# Patient Record
Sex: FEMALE | Race: WHITE
[De-identification: names, ages, dates, MRNs, and addresses within clinical notes are randomized per-mention and may not be internally consistent; named-entity substitution may affect disease eponyms.]

---

## 2023-07-18 ENCOUNTER — HOSPITAL ENCOUNTER (EMERGENCY)
Age: 48
Discharge: HOME | End: 2023-07-18
Payer: OTHER GOVERNMENT

## 2023-07-18 VITALS — OXYGEN SATURATION: 100 % | HEART RATE: 77 BPM | SYSTOLIC BLOOD PRESSURE: 119 MMHG | DIASTOLIC BLOOD PRESSURE: 64 MMHG

## 2023-07-18 VITALS
DIASTOLIC BLOOD PRESSURE: 63 MMHG | RESPIRATION RATE: 22 BRPM | TEMPERATURE: 98.06 F | HEART RATE: 86 BPM | OXYGEN SATURATION: 100 % | SYSTOLIC BLOOD PRESSURE: 134 MMHG

## 2023-07-18 VITALS — DIASTOLIC BLOOD PRESSURE: 72 MMHG | OXYGEN SATURATION: 100 % | SYSTOLIC BLOOD PRESSURE: 121 MMHG | HEART RATE: 82 BPM

## 2023-07-18 VITALS — SYSTOLIC BLOOD PRESSURE: 119 MMHG | OXYGEN SATURATION: 100 % | HEART RATE: 84 BPM | DIASTOLIC BLOOD PRESSURE: 71 MMHG

## 2023-07-18 VITALS — SYSTOLIC BLOOD PRESSURE: 130 MMHG | DIASTOLIC BLOOD PRESSURE: 71 MMHG

## 2023-07-18 VITALS — SYSTOLIC BLOOD PRESSURE: 122 MMHG | OXYGEN SATURATION: 100 % | DIASTOLIC BLOOD PRESSURE: 68 MMHG | HEART RATE: 76 BPM

## 2023-07-18 VITALS — BODY MASS INDEX: 33.9 KG/M2

## 2023-07-18 DIAGNOSIS — D64.9: Primary | ICD-10-CM

## 2023-07-18 LAB
ADD MANUAL DIFF / SLIDE REVIEW: NO
ALBUMIN SERPL-MCNC: 4.2 G/DL (ref 3.5–5)
ALBUMIN/GLOB SERPL: 1.4 {RATIO} (ref 1–2.8)
ALP SERPL-CCNC: 53 U/L (ref 38–126)
ALT SERPL-CCNC: 26 IU/L (ref ?–35)
BUN SERPL-MCNC: 12 MG/DL (ref 7–17)
CALCIUM SERPL-MCNC: 8.4 MG/DL (ref 8.4–10.2)
CHLORIDE SERPL-SCNC: 104 MMOL/L (ref 98–107)
CO2 SERPL-SCNC: 23 MMOL/L (ref 22–32)
ESTIMATED GLOMERULAR FILT RATE: > 60 ML/MIN (ref 60–?)
GLOBULIN SER CALC-MCNC: 3.1 G/DL (ref 1.7–4.1)
GLUCOSE SERPL-MCNC: 96 MG/DL (ref 70–100)
HEMATOCRIT: 23.9 % (ref 36–46)
HEMATOCRIT: 24.3 % (ref 36–46)
HEMOGLOBIN: 7.1 G/DL (ref 12–16)
HEMOGLOBIN: 7.3 G/DL (ref 12–16)
HEMOLYSIS: < 15 (ref 0–50)
HYPOCHROMIA BLD QL: (no result)
INR PPP: 1 (ref 0.9–1.3)
LYMPHOCYTES # SPEC AUTO: 1600 /UL (ref 1100–4500)
MCV RBC: 60.2 FL (ref 80–100)
MEAN CORPUSCULAR HEMOGLOBIN: 18.1 PG (ref 26–34)
MEAN CORPUSCULAR HGB CONC: 30.1 % (ref 30–36)
MICROCYTOSIS: (no result)
PLATELET COUNT: 289 X10^3/UL (ref 150–400)
POTASSIUM SERPL-SCNC: 3.9 MMOL/L (ref 3.4–5.1)
PROT SERPL-MCNC: 7.3 G/DL (ref 6.3–8.2)
PROTHROMBIN TIME: 11.7 SECONDS (ref 10.1–12.7)
PTT PARTIAL THROMBOPLASTIN TIM: 27 SECONDS (ref 26–36)
RBC MORPH BLD: (no result)
SODIUM SERPL-SCNC: 136 MMOL/L (ref 137–145)

## 2023-07-18 PROCEDURE — 85610 PROTHROMBIN TIME: CPT

## 2023-07-18 PROCEDURE — 85730 THROMBOPLASTIN TIME PARTIAL: CPT

## 2023-07-18 PROCEDURE — 85018 HEMOGLOBIN: CPT

## 2023-07-18 PROCEDURE — 86901 BLOOD TYPING SEROLOGIC RH(D): CPT

## 2023-07-18 PROCEDURE — 86900 BLOOD TYPING SEROLOGIC ABO: CPT

## 2023-07-18 PROCEDURE — 99284 EMERGENCY DEPT VISIT MOD MDM: CPT

## 2023-07-18 PROCEDURE — 85025 COMPLETE CBC W/AUTO DIFF WBC: CPT

## 2023-07-18 PROCEDURE — 85014 HEMATOCRIT: CPT

## 2023-07-18 PROCEDURE — 36415 COLL VENOUS BLD VENIPUNCTURE: CPT

## 2023-07-18 PROCEDURE — C9113 INJ PANTOPRAZOLE SODIUM, VIA: HCPCS

## 2023-07-18 PROCEDURE — 86850 RBC ANTIBODY SCREEN: CPT

## 2023-07-18 PROCEDURE — 80053 COMPREHEN METABOLIC PANEL: CPT

## 2023-07-18 PROCEDURE — 96374 THER/PROPH/DIAG INJ IV PUSH: CPT

## 2023-07-28 ENCOUNTER — HOSPITAL ENCOUNTER (OUTPATIENT)
Dept: HOSPITAL 76 - DI | Age: 48
Discharge: HOME | End: 2023-07-28
Attending: STUDENT IN AN ORGANIZED HEALTH CARE EDUCATION/TRAINING PROGRAM
Payer: COMMERCIAL

## 2023-07-28 DIAGNOSIS — N83.202: ICD-10-CM

## 2023-07-28 DIAGNOSIS — D25.0: Primary | ICD-10-CM

## 2023-07-28 DIAGNOSIS — D25.2: ICD-10-CM

## 2023-07-28 NOTE — ULTRASOUND REPORT
PROCEDURE:  Pelvic w/Transvaginal

 

INDICATIONS:  AUB

 

TECHNIQUE:  

Real-time scanning was performed of the pelvic organs, with image documentation.  Additional endovagi
nal scanning was necessary due to incomplete visualization of the adnexal and endometrial structures 
by transabdominal scanning.  

 

COMPARISON:  None.

 

FINDINGS:  

 

Uterus:  Uterus is anteverted and enlarged at 11.9 x 7.8 x 7.4 cm.  The myometrium is heterogeneous a
nd contains fibroids. Small amount of fluid is seen within the endometrial canal. Endometrium is subj
ectively within normal limits in thickness. The measured thickness includes a submucosal fibroid. Mul
tiple fibroids are seen as follows:

A left anterior transmural fibroid measures 2.6 x 3.2 x 3.1 cm.

A midline posterior nearly completely submucosal fibroid measures 2.2 x 2.0 x 2.1 cm.

A right posterior fundal intramural and subserosal fibroid measures 3.0 x 3.0 x 3.4 cm.

 

Ovaries:  The right ovary measures 2.5 x 2.3 x 2.1 cm, with a calculated ovarian volume of 6.2 cc.  T
he left ovary measures 7.3 x 6.4 x 5.3 cm, with a calculated ovarian volume of 129 cc. Adjacent simpl
e right ovarian cysts are seen measuring 4.4 x 4.3 x 3.6 cm and 3.2 x 3.4 x 2.3 cm respectively, vers
us a single septated cyst. No adnexal masses are seen. No cystic lesions measuring greater than 3 cm.


 

Other:  No pathologic free abdominal or pelvic fluid.

 

IMPRESSION:  

1.Multiple uterine fibroids are seen including a 2.2 cm submucosal fibroid.

2.Left ovary is enlarged by adjacent simple cysts versus a single septated cyst. Consider follow-up u
ltrasound in 6-12 weeks to demonstrate resolution.

 

 

 

Reviewed by: Dheeraj Luque MD on 7/28/2023 8:41 PM PDT

Approved by: Dheeraj Luque MD on 7/28/2023 8:41 PM PDT

 

 

Station ID:  [id number]

## 2023-08-21 ENCOUNTER — HOSPITAL ENCOUNTER (OUTPATIENT)
Dept: HOSPITAL 76 - LAB | Age: 48
Discharge: HOME | End: 2023-08-21
Attending: OBSTETRICS & GYNECOLOGY
Payer: COMMERCIAL

## 2023-08-21 DIAGNOSIS — N83.201: ICD-10-CM

## 2023-08-21 DIAGNOSIS — N92.0: ICD-10-CM

## 2023-08-21 DIAGNOSIS — D50.0: Primary | ICD-10-CM

## 2023-08-21 LAB
BASOPHILS NFR BLD AUTO: 0.1 10^3/UL (ref 0–0.1)
BASOPHILS NFR BLD AUTO: 1.3 %
CANCER AG125 SERPL-ACNC: 22.4 U/ML (ref 0.5–35)
EOSINOPHIL # BLD AUTO: 0.1 10^3/UL (ref 0–0.7)
EOSINOPHIL NFR BLD AUTO: 1.8 %
ERYTHROCYTE [DISTWIDTH] IN BLOOD BY AUTOMATED COUNT: 19.8 % (ref 12–15)
HCT VFR BLD AUTO: 30 % (ref 37–47)
HGB UR QL STRIP: 8.1 G/DL (ref 12–16)
LYMPHOCYTES # SPEC AUTO: 1.3 10^3/UL (ref 1.5–3.5)
LYMPHOCYTES NFR BLD AUTO: 23.7 %
MCH RBC QN AUTO: 17.8 PG (ref 27–31)
MCHC RBC AUTO-ENTMCNC: 27 G/DL (ref 32–36)
MCV RBC AUTO: 65.9 FL (ref 81–99)
MONOCYTES # BLD AUTO: 0.4 10^3/UL (ref 0–1)
MONOCYTES NFR BLD AUTO: 7.1 %
NEUTROPHILS # BLD AUTO: 3.6 10^3/UL (ref 1.5–6.6)
NEUTROPHILS # SNV AUTO: 5.5 X10^3/UL (ref 4.8–10.8)
NEUTROPHILS NFR BLD AUTO: 65.7 %
NRBC # BLD AUTO: 0 /100WBC
NRBC # BLD AUTO: 0 X10^3/UL
PDW BLD AUTO: 9.7 FL (ref 7.9–10.8)
PLAT MORPH BLD: (no result)
PLATELET # BLD: 302 10^3/UL (ref 130–450)
PLATELET BLD QL SMEAR: (no result)
RBC MAR: 4.55 10^6/UL (ref 4.2–5.4)
RBC MORPH BLD: (no result)
TSH SERPL-ACNC: 0.94 UIU/ML (ref 0.34–5.6)

## 2023-08-21 PROCEDURE — 85025 COMPLETE CBC W/AUTO DIFF WBC: CPT

## 2023-08-21 PROCEDURE — 36415 COLL VENOUS BLD VENIPUNCTURE: CPT

## 2023-08-21 PROCEDURE — 84443 ASSAY THYROID STIM HORMONE: CPT

## 2023-08-21 PROCEDURE — 86304 IMMUNOASSAY TUMOR CA 125: CPT

## 2023-09-05 ENCOUNTER — HOSPITAL ENCOUNTER (OUTPATIENT)
Dept: HOSPITAL 76 - LAB | Age: 48
Discharge: HOME | End: 2023-09-05
Attending: INTERNAL MEDICINE
Payer: COMMERCIAL

## 2023-09-05 DIAGNOSIS — D50.9: Primary | ICD-10-CM

## 2023-09-05 LAB
ERYTHROCYTE [DISTWIDTH] IN BLOOD BY AUTOMATED COUNT: 20.5 % (ref 12–15)
FERRITIN SERPL-SCNC: 3.9 NG/ML (ref 11–306.8)
HCT VFR BLD AUTO: 30.3 % (ref 37–47)
HGB UR QL STRIP: 8.3 G/DL (ref 12–16)
IRON SATN MFR SERPL: 3 % (ref 20–50)
IRON SERPL-MCNC: 14 UG/DL (ref 50–212)
MCH RBC QN AUTO: 18.1 PG (ref 27–31)
MCHC RBC AUTO-ENTMCNC: 27.4 G/DL (ref 32–36)
MCV RBC AUTO: 66.2 FL (ref 81–99)
NEUTROPHILS # BLD AUTO: 6.9 10^3/UL (ref 1.5–6.6)
NEUTROPHILS # SNV AUTO: 9.5 X10^3/UL (ref 4.8–10.8)
NEUTROPHILS NFR BLD AUTO: 72.2 %
PDW BLD AUTO: 9.2 FL (ref 7.9–10.8)
PLATELET # BLD: 385 10^3/UL (ref 130–450)
RBC MAR: 4.58 10^6/UL (ref 4.2–5.4)
TIBC SERPL-MCNC: 486 UG/DL (ref 250–450)
TRANSFERRIN SERPL-MCNC: 347 MG/DL (ref 203–362)

## 2023-09-05 PROCEDURE — 82728 ASSAY OF FERRITIN: CPT

## 2023-09-05 PROCEDURE — 83540 ASSAY OF IRON: CPT

## 2023-09-05 PROCEDURE — 85027 COMPLETE CBC AUTOMATED: CPT

## 2023-09-05 PROCEDURE — 84466 ASSAY OF TRANSFERRIN: CPT

## 2023-09-05 PROCEDURE — 36415 COLL VENOUS BLD VENIPUNCTURE: CPT

## 2023-09-18 ENCOUNTER — HOSPITAL ENCOUNTER (OUTPATIENT)
Dept: HOSPITAL 76 - DI | Age: 48
Discharge: HOME | End: 2023-09-18
Attending: NURSE PRACTITIONER
Payer: COMMERCIAL

## 2023-09-18 DIAGNOSIS — N83.292: ICD-10-CM

## 2023-09-18 DIAGNOSIS — D25.9: ICD-10-CM

## 2023-09-18 DIAGNOSIS — N83.8: ICD-10-CM

## 2023-09-18 DIAGNOSIS — N93.9: Primary | ICD-10-CM

## 2023-09-19 NOTE — ULTRASOUND REPORT
PROCEDURE:  Pelvic w/Transvaginal

 

INDICATIONS:  AUB

 

TECHNIQUE:  

Real-time scanning was performed of the pelvic organs, with image documentation.  Additional endovagi
nal scanning was necessary due to incomplete visualization of the adnexal and endometrial structures 
by transabdominal scanning.  

 

COMPARISON:  Ultrasound, 7/20/2023.

 

FINDINGS:  

 

Uterus:  Uterus is anteverted and normal in size at 12.2 x 5.4 x 7.7 cm.  The myometrium is homogeneo
us.  The endometrium measures 12.1 mm in combined thickness.  There multiple uterine fibroids.

 

-3.2 x 2.6 x 3.0 cm subserosal fibroid in the right posterior uterine wall; previously 3.0 x 3.0 x 3.
4 cm.

-2.1 x 1.6 x 2.0 cm submucosal fibroid in the left anterior uterine wall; previously 2.2 x 2.0 x 2.1 
cm.

-2.3 x 2.4 x 2.5 cm intramural fibroid in the anterior wall at midline; previously 2.6 x 3.2 x 3.1 cm
.

 

 Nabothian cysts noted in cervix.

 

Ovaries:  The right ovary measures 3.6 x 3.1 x 2.4 cm, with a calculated ovarian volume of 14.0 cc.  
The left ovary measures 4.1 x 3.1 x 3.5 cm, with a calculated ovarian volume of 23.3 cc.  The ovaries
 have a normal sonographic appearance.  Less than 12 follicles can be seen in each ovary.  There is a
 1.8 x 1.5 x 2.0 cm simple cyst in right ovary, likely a dominant follicle. A 2.3 x 2.0 x 2.2 cm comp
mary cyst is seen in left ovary. In addition, there is a 2.3 x 1.2 x 1.0 cm simple appearing cyst in t
he left ovary.

 

Other:  No pathologic free abdominal or pelvic fluid.

 

 

IMPRESSION:  

 

1. Uterus is enlarged and contains multiple uterine fibroids. Fibroids are overall unchanged in size.


 

2. Left ovary is enlarged. Previously seen  large cyst with septation is no longer seen. There is a n
ew 2.3 x 2.2 x 2.2 cm complex cyst in left ovary suggesting a hemorrhagic cyst. Consider a PA ultraso
und in 6-12 weeks.

 

 

 

Reviewed by: Donnie Sena MD on 9/19/2023 10:11 AM PDT

Approved by: Donnie Sena MD on 9/19/2023 10:11 AM PDT

 

 

Station ID:  [id number]